# Patient Record
Sex: FEMALE | Race: OTHER | Employment: STUDENT | URBAN - METROPOLITAN AREA
[De-identification: names, ages, dates, MRNs, and addresses within clinical notes are randomized per-mention and may not be internally consistent; named-entity substitution may affect disease eponyms.]

---
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
## 2019-01-04 ENCOUNTER — TELEPHONE (OUTPATIENT)
Dept: OPHTHALMOLOGY | Facility: CLINIC | Age: 19
End: 2019-01-04

## 2019-01-15 ENCOUNTER — OFFICE VISIT (OUTPATIENT)
Dept: OPHTHALMOLOGY | Facility: CLINIC | Age: 19
End: 2019-01-15
Payer: COMMERCIAL

## 2019-01-15 DIAGNOSIS — H00.15 CHALAZION LEFT LOWER EYELID: Primary | ICD-10-CM

## 2019-01-15 PROCEDURE — 67800 REMOVE EYELID LESION: CPT | Performed by: OPHTHALMOLOGY

## 2019-01-15 NOTE — PROGRESS NOTES
Edgar Brice is a 22year old female. HPI:     HPI     Patient is here for an excision of chalazion on the LLL, centrally. Patient does not like the appearance of the chalazion and wishes to proceed with excision.       Last edited by Benny Barragan if symptoms worsen or fail to improve.     1/15/2019  Scribed by: Pee Granados MD

## 2019-01-15 NOTE — PATIENT INSTRUCTIONS
Chalazion left lower eyelid  Excision of left lower lid laterally chalazion was done in the office today by Dr. Nisha Rivera without complications. Erythromycin was applied and pressure patch was applied to the left eye.   Patient can remove the pressure patch

## 2019-06-26 NOTE — ASSESSMENT & PLAN NOTE
Excision of left lower lid laterally chalazion was done in the office today by Dr. Zack Glasgow without complications. Erythromycin was applied and pressure patch was applied to the left eye. Patient can remove the pressure patch when he gets home.   Patient s None known

## 2019-10-18 ENCOUNTER — OFFICE VISIT (OUTPATIENT)
Dept: FAMILY MEDICINE CLINIC | Facility: CLINIC | Age: 19
End: 2019-10-18
Payer: COMMERCIAL

## 2019-10-18 VITALS
SYSTOLIC BLOOD PRESSURE: 111 MMHG | WEIGHT: 129 LBS | BODY MASS INDEX: 21 KG/M2 | HEART RATE: 97 BPM | DIASTOLIC BLOOD PRESSURE: 74 MMHG

## 2019-10-18 DIAGNOSIS — F41.0 PANIC ANXIETY SYNDROME: ICD-10-CM

## 2019-10-18 DIAGNOSIS — R53.83 OTHER FATIGUE: Primary | ICD-10-CM

## 2019-10-18 DIAGNOSIS — F33.8 SEASONAL AFFECTIVE DISORDER (HCC): ICD-10-CM

## 2019-10-18 PROCEDURE — 36416 COLLJ CAPILLARY BLOOD SPEC: CPT | Performed by: FAMILY MEDICINE

## 2019-10-18 PROCEDURE — 85018 HEMOGLOBIN: CPT | Performed by: FAMILY MEDICINE

## 2019-10-18 PROCEDURE — 99214 OFFICE O/P EST MOD 30 MIN: CPT | Performed by: FAMILY MEDICINE

## 2019-10-18 RX ORDER — FLUOXETINE HYDROCHLORIDE 20 MG/1
20 CAPSULE ORAL DAILY
Qty: 30 CAPSULE | Refills: 3 | Status: SHIPPED | OUTPATIENT
Start: 2019-10-18 | End: 2019-10-28

## 2019-10-18 NOTE — PROGRESS NOTES
Blood pressure 111/74, pulse 97, weight 129 lb (58.5 kg), not currently breastfeeding. COMPLAINING OF FATIGUE PATIENT IS CURRENTLY ON HER MENSES. SHE REPORTS THAT SHE HAS LOW ENERGY FOR THE PAST WEEK AND FEELS TIRED ALL THE TIME. SHE IS ON HER MENSES.

## 2019-10-22 ENCOUNTER — LAB ENCOUNTER (OUTPATIENT)
Dept: LAB | Age: 19
End: 2019-10-22
Attending: FAMILY MEDICINE
Payer: COMMERCIAL

## 2019-10-22 ENCOUNTER — TELEPHONE (OUTPATIENT)
Dept: FAMILY MEDICINE CLINIC | Facility: CLINIC | Age: 19
End: 2019-10-22

## 2019-10-22 DIAGNOSIS — F41.0 PANIC ANXIETY SYNDROME: ICD-10-CM

## 2019-10-22 DIAGNOSIS — R53.83 OTHER FATIGUE: ICD-10-CM

## 2019-10-22 DIAGNOSIS — E55.9 VITAMIN D DEFICIENCY: Primary | ICD-10-CM

## 2019-10-22 PROCEDURE — 81001 URINALYSIS AUTO W/SCOPE: CPT

## 2019-10-22 PROCEDURE — 82306 VITAMIN D 25 HYDROXY: CPT

## 2019-10-22 PROCEDURE — 36415 COLL VENOUS BLD VENIPUNCTURE: CPT

## 2019-10-22 PROCEDURE — 85025 COMPLETE CBC W/AUTO DIFF WBC: CPT

## 2019-10-22 PROCEDURE — 87086 URINE CULTURE/COLONY COUNT: CPT

## 2019-10-22 PROCEDURE — 80053 COMPREHEN METABOLIC PANEL: CPT

## 2019-10-22 PROCEDURE — 84703 CHORIONIC GONADOTROPIN ASSAY: CPT

## 2019-10-22 PROCEDURE — 84443 ASSAY THYROID STIM HORMONE: CPT

## 2019-10-22 PROCEDURE — 82533 TOTAL CORTISOL: CPT

## 2019-10-24 RX ORDER — ERGOCALCIFEROL 1.25 MG/1
50000 CAPSULE ORAL WEEKLY
Qty: 12 CAPSULE | Refills: 0 | Status: SHIPPED | OUTPATIENT
Start: 2019-10-24 | End: 2020-01-22

## 2019-10-24 NOTE — TELEPHONE ENCOUNTER
Spoke with the patient and instructed her on Dr. Cipriano Diggs' orders and plan of care. Patient voiced understanding and agreed with the plan of care.

## 2021-09-05 ENCOUNTER — HOSPITAL ENCOUNTER (OUTPATIENT)
Age: 21
Discharge: HOME | End: 2021-09-05
Payer: COMMERCIAL

## 2021-09-05 VITALS
DIASTOLIC BLOOD PRESSURE: 72 MMHG | RESPIRATION RATE: 18 BRPM | TEMPERATURE: 98 F | SYSTOLIC BLOOD PRESSURE: 113 MMHG | OXYGEN SATURATION: 100 % | HEART RATE: 80 BPM | HEIGHT: 66 IN | BODY MASS INDEX: 21.86 KG/M2 | WEIGHT: 136 LBS

## 2021-09-05 DIAGNOSIS — R31.9 URINARY TRACT INFECTION WITH HEMATURIA, SITE UNSPECIFIED: Primary | ICD-10-CM

## 2021-09-05 DIAGNOSIS — K64.9 HEMORRHOIDS, UNSPECIFIED HEMORRHOID TYPE: ICD-10-CM

## 2021-09-05 DIAGNOSIS — N39.0 URINARY TRACT INFECTION WITH HEMATURIA, SITE UNSPECIFIED: Primary | ICD-10-CM

## 2021-09-05 LAB
B-HCG UR QL: NEGATIVE
BILIRUB UR QL STRIP: NEGATIVE
COLOR UR: YELLOW
GLUCOSE UR STRIP-MCNC: NEGATIVE MG/DL
KETONES UR STRIP-MCNC: NEGATIVE MG/DL
NITRITE UR QL STRIP: NEGATIVE
PH UR STRIP: 6 [PH]
SP GR UR STRIP: 1.02
UROBILINOGEN UR STRIP-ACNC: <2 MG/DL

## 2021-09-05 PROCEDURE — 81002 URINALYSIS NONAUTO W/O SCOPE: CPT | Performed by: NURSE PRACTITIONER

## 2021-09-05 PROCEDURE — 87086 URINE CULTURE/COLONY COUNT: CPT | Performed by: NURSE PRACTITIONER

## 2021-09-05 PROCEDURE — 99203 OFFICE O/P NEW LOW 30 MIN: CPT | Performed by: NURSE PRACTITIONER

## 2021-09-05 PROCEDURE — 81025 URINE PREGNANCY TEST: CPT | Performed by: NURSE PRACTITIONER

## 2021-09-05 PROCEDURE — 87186 SC STD MICRODIL/AGAR DIL: CPT | Performed by: NURSE PRACTITIONER

## 2021-09-05 PROCEDURE — 87088 URINE BACTERIA CULTURE: CPT | Performed by: NURSE PRACTITIONER

## 2021-09-05 RX ORDER — CEPHALEXIN 500 MG/1
500 CAPSULE ORAL 2 TIMES DAILY
Qty: 20 CAPSULE | Refills: 0 | Status: SHIPPED | OUTPATIENT
Start: 2021-09-05 | End: 2021-09-15

## 2022-05-18 ENCOUNTER — HOSPITAL ENCOUNTER (OUTPATIENT)
Age: 22
Discharge: HOME | End: 2022-05-18
Payer: COMMERCIAL

## 2022-05-18 ENCOUNTER — APPOINTMENT (OUTPATIENT)
Dept: GENERAL RADIOLOGY | Age: 22
End: 2022-05-18
Attending: NURSE PRACTITIONER
Payer: COMMERCIAL

## 2022-05-18 VITALS
SYSTOLIC BLOOD PRESSURE: 126 MMHG | BODY MASS INDEX: 22.5 KG/M2 | RESPIRATION RATE: 16 BRPM | TEMPERATURE: 98 F | HEIGHT: 66 IN | HEART RATE: 85 BPM | OXYGEN SATURATION: 100 % | DIASTOLIC BLOOD PRESSURE: 75 MMHG | WEIGHT: 140 LBS

## 2022-05-18 DIAGNOSIS — R05.9 COUGH: ICD-10-CM

## 2022-05-18 DIAGNOSIS — J06.9 VIRAL UPPER RESPIRATORY TRACT INFECTION: Primary | ICD-10-CM

## 2022-05-18 PROCEDURE — 71046 X-RAY EXAM CHEST 2 VIEWS: CPT | Performed by: NURSE PRACTITIONER

## 2022-05-18 RX ORDER — TRIAMCINOLONE ACETONIDE 55 UG/1
1 SPRAY, METERED NASAL 2 TIMES DAILY
Qty: 10.8 ML | Refills: 0 | Status: SHIPPED | OUTPATIENT
Start: 2022-05-18

## 2025-02-20 NOTE — ED PROVIDER NOTES
Is This A New Presentation, Or A Follow-Up?: Skin Lesion Patient Seen in: [facility]      History   Patient presents with:  Urinary Symptoms    Stated Complaint: blood in stool and urine    HPI/Subjective:   HPI    22 yo female arrives to the ic with co blood in stool and urine, pt states dairrhea Normocephalic and atraumatic.       Right Ear: External ear normal.      Left Ear: External ear normal.      Nose: Nose normal.      Mouth/Throat:      Mouth: Mucous membranes are moist.      Pharynx: No oropharyngeal exudate or posterior oropharyngeal eryt Clarity Slightly cloudy (A) Clear    Specific Gravity, Urine 1.020 1.005 - 1.030    PH, Urine 6.0 5.0 - 8.0    Protein urine Trace (A) Negative mg/dL    Glucose, Urine Negative Negative mg/dL    Ketone, Urine Negative Negative mg/dL    Bilirubin, Urine Neg interpreting tests, discussion with consultants, patient communication/counseling, and chart documentation but not including time spent performing procedures.             MDM   Findings consistent with hemorrhoid and uti less likely pyelo, gi bleed, pid, st

## (undated) NOTE — LETTER
06/25/20        Jaquan Austintatasravani      Dear Wan Skinner,    [facility] records indicate that you have outstanding lab work and or testing that was ordered for you and has not yet been completed:  Orders Placed This Encoun

## (undated) NOTE — MR AVS SNAPSHOT
[facility]  [address], [address]  [address] [facility]  [phone]               Thank you for choosing us for your health care visit with Fadi Bautista. DO Mitul.   We are glad to serve you and happy to provide you with this walls HCG, Beta Subunit, Qual [E]    Complete by:  Feb 14, 2017 (Approximate)    Assoc Dx:  Depression, unspecified depression type [F32.9]           Psychiatry Referral - In Network    Complete by:  As directed    Assoc Dx:  Depression, unspecified depre and scope of the condition. • A licensed professional will complete your assessment and then consult with a psychiatrist to recommend an appropriate level of behavioral health treatment.     • A signed Release of Information is recommended to facilitate • Partial Hospitalization Program (PHP)    • Inpatient    • Residential    • Psychiatric Services/Medication Management ASSESSMENT DETAILS    • All behavioral health assessments are free of charge and strictly confidential.    • Assessments typically take Behvioral Health Assessment and Treatment    SETTING UP A BEHAVIORAL HEALTH ASSESSMENT   To schedule an assessment at any of our below locations call: [phone]. Emergency walk-in assessments are available 24/7 at our [facility].  Wait ti PSYCHOLOGY - INTERNAL [[id number] CUSTOM]  Order #:  [id number]         **REFERRAL REQUEST**    Your physician has referred you to a specialist.  Your physician or the clinic staff will provide you with the phone number you should call to schedule your ap • Assessments typically take two hours based on appointment availability and scope of the condition.   • A licensed professional will complete your assessment and then consult with a psychiatrist to recommend an appropriate level of behavioral health treatm help them live a healthy active lifestyle.     To lead a healthy active life, families can strive to reach these goals:  o 5 servings of fruits and vegetables a day  o 4 servings of water a day  o 3 servings of low-fat dairy a day  o 2 or less hours of scre

## (undated) NOTE — MR AVS SNAPSHOT
[facility]  [address]  [address] [facility]  [phone]               Thank you for choosing us for your health care visit with Christopher Knott. DO Mitul.   We are glad to serve you and happy to provide you with this walls Sign Up Forms link in the Additional Information box on the right. Doblet Questions? Call [phone] for help. Doblet is NOT to be used for urgent needs. For medical emergencies, dial 911.                Visit [facility] online a

## (undated) NOTE — Clinical Note
VACCINE ADMINISTRATION RECORD  PARENT / GUARDIAN APPROVAL  Date: 2017  Vaccine administered to: Brian Luna     : 10/31/2000    MRN: [id number]    A copy of the appropriate Centers for Disease Control and Prevention Vaccine Information stat

## (undated) NOTE — LETTER
08/25/20        Otto Haver  [address] [facility]      Dear Michael Warner,    [address] records indicate that you have outstanding lab work and or testing that was ordered for you and has not yet been completed:  Orders Placed This Encoun

## (undated) NOTE — LETTER
November 7, 2018    Peyman Trevino DO  [address] [place] [id number]     Patient: Sarah Shipman   YOB: 2000   Date of Visit: 11/7/2018       Dear Dr. Jason Castellanos DO:    Thank you for referring Sarah Shipman t Dist cc 20/20 -2 20/20    Correction:  Glasses          Pupils       Pupils    Right PERRL    Left PERRL            Slit Lamp and Fundus Exam     Slit Lamp Exam       Right Left    Lids/Lashes Meibomian gland dysfunction large chalazion laterally LLL and If you have questions, please do not hesitate to call me. I look forward to following Geronimo  along with you.     Sincerely,        Callie Hatch MD        CC: No Recipients    Document electronically generated by: Callie Hatch

## (undated) NOTE — LETTER
AUTHORIZATION FOR SURGICAL OPERATION OR OTHER PROCEDURE    1.  I hereby authorize Dr. Oliva Boyd, and [facility] staff assigned to my case to perform the following operation and/or procedure at the [facility]:  __Excision of chalazion le Time:  ________ A. M.  P.M.        Patient Name:  ______________________________________________________  (please print)      Patient signature:  ___________________________________________________             Relationship to Patient:

## (undated) NOTE — Clinical Note
[facility] [employer] of Child Health Examination       Student's Name  Mirela Diego 1.Clinical diagnosis (measles, mumps, hepatits B) is allowed when verified by physician & supported with lab confirmation. Attach copy of lab result.        *MEASLES (Rubeola)  MO/DA/YR        * MUMPS MO/DA/YR       HEPATITIS B   MO/DA/YR        VARICELLA M Yes       No    Loss of function of one of paired organs? (eye/ear/kidney/testicle)  Yes       No      Birth Defects? Developmental delay? Yes       No   Yes       No  Hospitalizations? When? What for? Yes       No    Blood disorders?   Hemophilia, Si ovarian syndrome, acanthosis nigricans)                           At Risk     Lead Risk Questionnaire  Req'd for children 6 months thru 6 yrs enrolled in licensed or public school operated day care, ,  nursery school and/or  (blood joann SPECIAL INSTRUCTIONS/DEVICES e.g. safety glasses, glass eye, chest protector for arrhythmia, pacemaker, prosthetic device, dental bridge, false teeth, athleticsupport/cup     None   MENTAL HEALTH/OTHER   Is there anything else the school should know about

## (undated) NOTE — Clinical Note
Name:  Ben Scott Year:  11th Grade Class: Student ID No.:   Address:  [address] [place] [address] Phone:  [phone] (home)  : [birth year] 15year old   Name Relationship Lgl [address] Work Phone Home Phone Mobile Phone   1 polymorphic ventricular tachycardia? 13. Does anyone in your family have a heart problem, pacemaker, or implanted defibrillator? 12. Has anyone in your family had unexplained fainting, seizures, or near drowning?      BONE AND JOINT QUESTIONS Yes No 38. Have you ever had numbness, tingling, or weakness in your arms or legs after being hit or falling? 39.Have you ever been unable to move your arms / legs after being hit /fall? 40. Have you ever become ill while exercising in the heat?     41.  D Eyes/Ears/Nose/Throat:  Pupils equal    Hearing Yes    Lymph nodes Yes    Heart*  · Murmurs (auscultation standing, supine, +/- Valsalva)  · Location of point of maximal impulse (PMI) Yes    Pulses Yes    Lungs Yes    Abdomen Yes    Genitourinary (males on performance-enhancing substances in my/his/her body either during [employer] state series events or during the school day, and I/our student do/does hereby agree to submit to such testing and analysis by a certified laboratory.  We further understand and agree th